# Patient Record
(demographics unavailable — no encounter records)

---

## 2024-12-13 NOTE — HISTORY OF PRESENT ILLNESS
[FreeTextEntry1] : 27-year-old female presents for well woman exam.    She has no complaints today.  Menarche was at the age of 14.  Menses are every 28 days, lasting 4 to 5 days.  She does get heavy flow for the first 2 days but does not saturate through more than 1 pad per hour.  She gets mild cramping which does not require medications.  She has no significant GYN history.  She is sexually active and is using condoms for contraception.  She has no significant past medical or surgical history.  Family history is noncontributory.  She does use an e-cigarette.  She socially drinks alcohol.  She denies illicit drug use.  GYN history as above.  She is nulligravida.  She has no known drug allergies.  She does not take any medications.  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

## 2024-12-13 NOTE — PLAN
[FreeTextEntry1] : 27-year-old female well woman exam  1.  Pap done  2.   Patient happy with using condoms for now. 3. Annual exam in 1 year